# Patient Record
Sex: MALE | Race: BLACK OR AFRICAN AMERICAN | NOT HISPANIC OR LATINO | Employment: FULL TIME | URBAN - METROPOLITAN AREA
[De-identification: names, ages, dates, MRNs, and addresses within clinical notes are randomized per-mention and may not be internally consistent; named-entity substitution may affect disease eponyms.]

---

## 2024-03-12 ENCOUNTER — HOSPITAL ENCOUNTER (OUTPATIENT)
Dept: RADIOLOGY | Facility: CLINIC | Age: 44
Discharge: HOME | End: 2024-03-12
Payer: COMMERCIAL

## 2024-03-12 ENCOUNTER — OFFICE VISIT (OUTPATIENT)
Dept: ORTHOPEDIC SURGERY | Facility: CLINIC | Age: 44
End: 2024-03-12
Payer: COMMERCIAL

## 2024-03-12 VITALS — BODY MASS INDEX: 31.34 KG/M2 | HEIGHT: 66 IN | WEIGHT: 195 LBS

## 2024-03-12 DIAGNOSIS — M54.2 NECK PAIN: ICD-10-CM

## 2024-03-12 DIAGNOSIS — M54.2 NECK PAIN: Primary | ICD-10-CM

## 2024-03-12 PROCEDURE — 99213 OFFICE O/P EST LOW 20 MIN: CPT | Performed by: ORTHOPAEDIC SURGERY

## 2024-03-12 PROCEDURE — 72040 X-RAY EXAM NECK SPINE 2-3 VW: CPT | Performed by: ORTHOPAEDIC SURGERY

## 2024-03-12 PROCEDURE — 72040 X-RAY EXAM NECK SPINE 2-3 VW: CPT

## 2024-03-12 ASSESSMENT — PAIN SCALES - GENERAL: PAINLEVEL_OUTOF10: 0 - NO PAIN

## 2024-03-12 ASSESSMENT — PAIN - FUNCTIONAL ASSESSMENT: PAIN_FUNCTIONAL_ASSESSMENT: 0-10

## 2024-03-12 NOTE — PROGRESS NOTES
Payton Knight is a 43 y.o. male who presents for Follow-up of the Neck (C5-6 ACDF from 2/27/2023/1 year out/X-rays today).    HPI:  43-year-old gentleman here for surgical follow-up.  He is 1 year out from a C5-6 ACDF.  He denies any fever chills nausea vomiting night sweats.  He has no bowel or bladder complaints.    Physical exam:  Well-nourished, well kept.   Patient can rise from a seated position, can sit from a standing position. Can get up heels and toes.  Patient is not tender in the paraspinal musculature of the cervical spine. His range of motion is intact.  Examination of the upper extremities reveals no point tenderness, swelling, or deformity.  Range of motion of the shoulders, elbows, wrists, and fingers are full without crepitance, instability, or exacerbation of pain. Strength is 5/5 throughout. no redness, abrasions, or lesions on the upper extremities bilaterally.  Gross sensation intact to the extremities.  Deep tendon reflexes 1+ and symmetric bilaterally.  Jacobo negative.  Affect normal.  Alert and oriented X 3.  Coordination normal.  Incision is very well-healed.    Imaging studies:  AP lateral plain films of the cervical spine were obtained and reviewed today.    Assessment:  43-year-old gentleman here for surgical follow-up.  He is 1 year out from C5-6 ACDF.  He is doing great today.  100% better.  Occasional stiffness here and there in the neck, and he still has a little bit of numbness and tingling out into the fingers on his left hand but overall he is very happy with the outcome of the surgery.  He is engaging in activities as tolerated, he is back to doing the things he enjoys doing that he could not do before the surgery.    Plan:  For complete plan and/or surgical details, please refer to Dr. Loyd's portion of this split dictation.    In a face-to-face encounter, I performed a history and physical examination, discussed pertinent diagnostic studies if indicated, and discussed  diagnosis and management strategies with both the patient and the midlevel provider.  I reviewed the midlevel's note and agree with the documented findings and plan of care.      Patient doing very well 1 year out from a C5-6 ACDF.  He still has a little numbness in his hands which I think is carpal tunnel syndrome.  He has no radicular symptoms.  No neck pain.  X-rays look really good today.  Working to let him engage in activities as tolerated and he can follow-up with one of my hand partners for his carpal tunnel syndrome.  He can see me on a as needed basis.

## 2024-03-19 ENCOUNTER — APPOINTMENT (OUTPATIENT)
Dept: ORTHOPEDIC SURGERY | Facility: CLINIC | Age: 44
End: 2024-03-19
Payer: COMMERCIAL

## 2024-03-21 ENCOUNTER — APPOINTMENT (OUTPATIENT)
Dept: ORTHOPEDIC SURGERY | Facility: CLINIC | Age: 44
End: 2024-03-21
Payer: COMMERCIAL

## 2024-11-12 ENCOUNTER — APPOINTMENT (OUTPATIENT)
Dept: ORTHOPEDIC SURGERY | Facility: CLINIC | Age: 44
End: 2024-11-12
Payer: COMMERCIAL

## 2024-11-13 ENCOUNTER — APPOINTMENT (OUTPATIENT)
Dept: RADIOLOGY | Facility: HOSPITAL | Age: 44
End: 2024-11-13
Payer: COMMERCIAL

## 2024-11-13 ENCOUNTER — HOSPITAL ENCOUNTER (EMERGENCY)
Facility: HOSPITAL | Age: 44
Discharge: HOME | End: 2024-11-13
Attending: EMERGENCY MEDICINE
Payer: COMMERCIAL

## 2024-11-13 ENCOUNTER — APPOINTMENT (OUTPATIENT)
Dept: CARDIOLOGY | Facility: HOSPITAL | Age: 44
End: 2024-11-13
Payer: COMMERCIAL

## 2024-11-13 VITALS
HEART RATE: 87 BPM | TEMPERATURE: 98.1 F | SYSTOLIC BLOOD PRESSURE: 164 MMHG | HEIGHT: 66 IN | WEIGHT: 195 LBS | OXYGEN SATURATION: 100 % | DIASTOLIC BLOOD PRESSURE: 95 MMHG | RESPIRATION RATE: 18 BRPM | BODY MASS INDEX: 31.34 KG/M2

## 2024-11-13 DIAGNOSIS — G44.209 TENSION HEADACHE: ICD-10-CM

## 2024-11-13 DIAGNOSIS — R07.9 CHEST PAIN, UNSPECIFIED TYPE: Primary | ICD-10-CM

## 2024-11-13 LAB
ALBUMIN SERPL BCP-MCNC: 4.6 G/DL (ref 3.4–5)
ALP SERPL-CCNC: 74 U/L (ref 33–120)
ALT SERPL W P-5'-P-CCNC: 29 U/L (ref 10–52)
ANION GAP SERPL CALC-SCNC: 11 MMOL/L (ref 10–20)
APTT PPP: 30 SECONDS (ref 27–38)
AST SERPL W P-5'-P-CCNC: 25 U/L (ref 9–39)
ATRIAL RATE: 102 BPM
BASOPHILS # BLD AUTO: 0.02 X10*3/UL (ref 0–0.1)
BASOPHILS NFR BLD AUTO: 0.2 %
BILIRUB SERPL-MCNC: 0.5 MG/DL (ref 0–1.2)
BUN SERPL-MCNC: 11 MG/DL (ref 6–23)
CALCIUM SERPL-MCNC: 9.3 MG/DL (ref 8.6–10.3)
CARDIAC TROPONIN I PNL SERPL HS: 6 NG/L (ref 0–20)
CARDIAC TROPONIN I PNL SERPL HS: 6 NG/L (ref 0–20)
CHLORIDE SERPL-SCNC: 104 MMOL/L (ref 98–107)
CO2 SERPL-SCNC: 28 MMOL/L (ref 21–32)
CREAT SERPL-MCNC: 0.96 MG/DL (ref 0.5–1.3)
D DIMER PPP FEU-MCNC: <215 NG/ML FEU
EGFRCR SERPLBLD CKD-EPI 2021: >90 ML/MIN/1.73M*2
EOSINOPHIL # BLD AUTO: 0.07 X10*3/UL (ref 0–0.7)
EOSINOPHIL NFR BLD AUTO: 0.7 %
ERYTHROCYTE [DISTWIDTH] IN BLOOD BY AUTOMATED COUNT: 14.1 % (ref 11.5–14.5)
FLUAV RNA RESP QL NAA+PROBE: NOT DETECTED
FLUBV RNA RESP QL NAA+PROBE: NOT DETECTED
GLUCOSE SERPL-MCNC: 90 MG/DL (ref 74–99)
HCT VFR BLD AUTO: 42.2 % (ref 41–52)
HGB BLD-MCNC: 14.9 G/DL (ref 13.5–17.5)
IMM GRANULOCYTES # BLD AUTO: 0.02 X10*3/UL (ref 0–0.7)
IMM GRANULOCYTES NFR BLD AUTO: 0.2 % (ref 0–0.9)
INR PPP: 1 (ref 0.9–1.1)
LIPASE SERPL-CCNC: 43 U/L (ref 9–82)
LYMPHOCYTES # BLD AUTO: 3.92 X10*3/UL (ref 1.2–4.8)
LYMPHOCYTES NFR BLD AUTO: 40.7 %
MAGNESIUM SERPL-MCNC: 2.07 MG/DL (ref 1.6–2.4)
MCH RBC QN AUTO: 31.9 PG (ref 26–34)
MCHC RBC AUTO-ENTMCNC: 35.3 G/DL (ref 32–36)
MCV RBC AUTO: 90 FL (ref 80–100)
MONOCYTES # BLD AUTO: 0.91 X10*3/UL (ref 0.1–1)
MONOCYTES NFR BLD AUTO: 9.4 %
NEUTROPHILS # BLD AUTO: 4.7 X10*3/UL (ref 1.2–7.7)
NEUTROPHILS NFR BLD AUTO: 48.8 %
NRBC BLD-RTO: 0 /100 WBCS (ref 0–0)
P AXIS: 52 DEGREES
P OFFSET: 210 MS
P ONSET: 162 MS
PLATELET # BLD AUTO: 282 X10*3/UL (ref 150–450)
POTASSIUM SERPL-SCNC: 3.6 MMOL/L (ref 3.5–5.3)
PR INTERVAL: 126 MS
PROT SERPL-MCNC: 7.4 G/DL (ref 6.4–8.2)
PROTHROMBIN TIME: 11.5 SECONDS (ref 9.8–12.8)
Q ONSET: 225 MS
QRS COUNT: 17 BEATS
QRS DURATION: 84 MS
QT INTERVAL: 338 MS
QTC CALCULATION(BAZETT): 440 MS
QTC FREDERICIA: 403 MS
R AXIS: 31 DEGREES
RBC # BLD AUTO: 4.67 X10*6/UL (ref 4.5–5.9)
SARS-COV-2 RNA RESP QL NAA+PROBE: NOT DETECTED
SODIUM SERPL-SCNC: 139 MMOL/L (ref 136–145)
T AXIS: 8 DEGREES
T OFFSET: 394 MS
TSH SERPL-ACNC: 1.88 MIU/L (ref 0.44–3.98)
VENTRICULAR RATE: 102 BPM
WBC # BLD AUTO: 9.6 X10*3/UL (ref 4.4–11.3)

## 2024-11-13 PROCEDURE — 70450 CT HEAD/BRAIN W/O DYE: CPT | Performed by: RADIOLOGY

## 2024-11-13 PROCEDURE — 84484 ASSAY OF TROPONIN QUANT: CPT

## 2024-11-13 PROCEDURE — 85610 PROTHROMBIN TIME: CPT

## 2024-11-13 PROCEDURE — 84443 ASSAY THYROID STIM HORMONE: CPT

## 2024-11-13 PROCEDURE — 87636 SARSCOV2 & INF A&B AMP PRB: CPT

## 2024-11-13 PROCEDURE — 71045 X-RAY EXAM CHEST 1 VIEW: CPT

## 2024-11-13 PROCEDURE — 96374 THER/PROPH/DIAG INJ IV PUSH: CPT

## 2024-11-13 PROCEDURE — 83690 ASSAY OF LIPASE: CPT

## 2024-11-13 PROCEDURE — 36415 COLL VENOUS BLD VENIPUNCTURE: CPT

## 2024-11-13 PROCEDURE — 83735 ASSAY OF MAGNESIUM: CPT

## 2024-11-13 PROCEDURE — 71045 X-RAY EXAM CHEST 1 VIEW: CPT | Performed by: RADIOLOGY

## 2024-11-13 PROCEDURE — 70450 CT HEAD/BRAIN W/O DYE: CPT

## 2024-11-13 PROCEDURE — 84075 ASSAY ALKALINE PHOSPHATASE: CPT

## 2024-11-13 PROCEDURE — 85379 FIBRIN DEGRADATION QUANT: CPT

## 2024-11-13 PROCEDURE — 2500000004 HC RX 250 GENERAL PHARMACY W/ HCPCS (ALT 636 FOR OP/ED)

## 2024-11-13 PROCEDURE — 99285 EMERGENCY DEPT VISIT HI MDM: CPT | Mod: 25

## 2024-11-13 PROCEDURE — 85025 COMPLETE CBC W/AUTO DIFF WBC: CPT

## 2024-11-13 PROCEDURE — 93005 ELECTROCARDIOGRAM TRACING: CPT

## 2024-11-13 RX ORDER — NAPROXEN SODIUM 550 MG/1
550 TABLET ORAL
Qty: 14 TABLET | Refills: 0 | Status: SHIPPED | OUTPATIENT
Start: 2024-11-13 | End: 2024-11-20

## 2024-11-13 RX ORDER — CETIRIZINE HYDROCHLORIDE 10 MG/1
10 TABLET ORAL
COMMUNITY
Start: 2023-01-17

## 2024-11-13 RX ORDER — ATORVASTATIN CALCIUM 10 MG/1
10 TABLET, FILM COATED ORAL DAILY
COMMUNITY

## 2024-11-13 RX ORDER — KETOROLAC TROMETHAMINE 30 MG/ML
30 INJECTION, SOLUTION INTRAMUSCULAR; INTRAVENOUS ONCE
Status: COMPLETED | OUTPATIENT
Start: 2024-11-13 | End: 2024-11-13

## 2024-11-13 RX ORDER — ALBUTEROL SULFATE 90 UG/1
2 INHALANT RESPIRATORY (INHALATION) EVERY 4 HOURS PRN
COMMUNITY
Start: 2020-01-12 | End: 2025-07-18

## 2024-11-13 ASSESSMENT — LIFESTYLE VARIABLES
EVER HAD A DRINK FIRST THING IN THE MORNING TO STEADY YOUR NERVES TO GET RID OF A HANGOVER: NO
EVER FELT BAD OR GUILTY ABOUT YOUR DRINKING: NO
TOTAL SCORE: 0
HAVE PEOPLE ANNOYED YOU BY CRITICIZING YOUR DRINKING: NO
HAVE YOU EVER FELT YOU SHOULD CUT DOWN ON YOUR DRINKING: NO

## 2024-11-13 ASSESSMENT — COLUMBIA-SUICIDE SEVERITY RATING SCALE - C-SSRS
2. HAVE YOU ACTUALLY HAD ANY THOUGHTS OF KILLING YOURSELF?: NO
1. IN THE PAST MONTH, HAVE YOU WISHED YOU WERE DEAD OR WISHED YOU COULD GO TO SLEEP AND NOT WAKE UP?: NO
6. HAVE YOU EVER DONE ANYTHING, STARTED TO DO ANYTHING, OR PREPARED TO DO ANYTHING TO END YOUR LIFE?: NO

## 2024-11-13 ASSESSMENT — PAIN SCALES - PAIN ASSESSMENT IN ADVANCED DEMENTIA (PAINAD): TOTALSCORE: MEDICATION (SEE MAR)

## 2024-11-13 ASSESSMENT — PAIN DESCRIPTION - DESCRIPTORS: DESCRIPTORS: PRESSURE

## 2024-11-13 ASSESSMENT — PAIN DESCRIPTION - ONSET: ONSET: ONGOING

## 2024-11-13 ASSESSMENT — PAIN DESCRIPTION - PAIN TYPE: TYPE: ACUTE PAIN

## 2024-11-13 ASSESSMENT — PAIN DESCRIPTION - LOCATION: LOCATION: CHEST

## 2024-11-13 ASSESSMENT — PAIN SCALES - GENERAL: PAINLEVEL_OUTOF10: 7

## 2024-11-13 ASSESSMENT — PAIN DESCRIPTION - FREQUENCY: FREQUENCY: CONSTANT/CONTINUOUS

## 2024-11-13 ASSESSMENT — PAIN DESCRIPTION - PROGRESSION: CLINICAL_PROGRESSION: NOT CHANGED

## 2024-11-13 ASSESSMENT — PAIN - FUNCTIONAL ASSESSMENT: PAIN_FUNCTIONAL_ASSESSMENT: 0-10

## 2024-11-13 NOTE — ED PROVIDER NOTES
"HPI   Chief Complaint   Patient presents with    Chest Pain     \"Pain in chest, head, and hands/feet are ice cold. I feel like my head is on fire\"    Flu Symptoms       History provided by: Patient    Limitations to history: None    CC: Multiple medical complaints    HPI: 43-year-old male with a history of asthma presents the emergency department to be evaluated for multiple medical complaints.  Patient states that he feels he started feeling generally unwell earlier today.  He reports feeling warm without documented fever and chills.  Denies body aches.  Reports a mild generalized headache that started 1 hour prior to arrival.  He characterized the headache is \"aching \"and localizes to the top of his head.  He denies it having sudden or abrupt onset or being the worst headache he is ever had.  Denies head injury or syncopal episode.  Denies use of anticoagulants or antiplatelets or history of intracranial hemorrhage.  Denies neck pain or stiffness.  Denies vision changes.  Denies sensitivity to light and sound.  Denies slurred speech or facial drooping.  Denies numbness tingling or weakness in extremities.  Denies lightheadedness or dizziness.  Patient also states he is having some chest achiness as well.  Denies shortness of breath.  Does report intermittent palpitations.  Patient denies history of CAD, his last stress test was approximately February 2024 but is never required stent placement or bypass.  He denies history of heart failure and denies pain or swelling in the extremities with use of diuretics.  He denies history of DVTs or PEs and he denies recent plane flights, recent surgeries, history of cancer.  He does have a history of asthma and he was in his inhaler twice today but did not help much with his symptoms.  He denies cough or hemoptysis.  Denies runny nose and congestion.  Denies nausea vomiting, diarrhea constipation, blood in the urine or stool.  Denies abdominal pain or flank pain.  Denies " weakness and fatigue.  Denies all other systemic symptoms.    ROS: Negative unless mentioned in HPI    Medical Hx: Allergy to [place] and hydrocodone.  Immunizations up-to-date.    Physical exam:    Constitutional: Patient is well-nourished and well-developed.  Sitting comfortably in the room and in no distress.  Oriented to person, place, time, and situation.    HEENT: Head is normocephalic, atraumatic. Patient's airway is patent.  Tympanic membranes are clear bilaterally.  Nasal mucosa clear.  Mouth with normal mucosa.  Throat is not erythematous and there are no oropharyngeal exudates, uvula is midline.  No obvious facial deformities.  No nuchal rigidity or meningeal signs.    Eyes: Clear bilaterally.  Pupils are equal round and reactive to light and accommodation.  Extraocular movements intact.      Cardiac: Tachycardia, regular rhythm.  Heart sounds S1, S2.  No murmurs, rubs, or gallops.  PMI nondisplaced.  No JVD.    Respiratory: Regular respiratory rate and effort.  Breath sounds are clear and equal bilaterally, no adventitious lung sounds.  Patient is speaking in full sentences and is in no apparent respiratory distress. No use of accessory muscles.      Gastrointestinal: Abdomen is soft, nondistended, and nontender.  There are no obvious deformities.  No rebound tenderness or guarding.  Bowel sounds are normal active.    Genitourinary: No CVA or flank tenderness.    Musculoskeletal: No reproducible tenderness.  No obvious skin or bony deformities.  Patient has equal range of motion in all extremities and no strength deficiencies.  No muscle or joint tenderness. No back or neck tenderness.  Capillary refill less than 3 seconds.  Strong peripheral pulses.  No sensory deficits.    Neurological: Patient is alert and oriented.  No focal deficits.  5/5 strength in all extremities.  Cranial nerves II through XII intact. GCS15.  NIH 0.    Skin: Skin is normal color for race and is warm, dry, and intact.  No evidence  of trauma.  No lesions, rashes, bruising, jaundice, or masses.    Psych: Appropriate mood and affect.  No apparent risk to self or others.    Heme/lymph: No adenopathy, lymphadenopathy, or splenomegaly    Physical exam is otherwise negative unless stated above or in history of present illness.              Patient History   No past medical history on file.  Past Surgical History:   Procedure Laterality Date    XR CHEST PACEMAKER WITH FLUORO  2/27/2023    XR CHEST PACEMAKER WITH FLUORO 2/27/2023 [facility]     No family history on file.  Social History     Tobacco Use    Smoking status: Every Day     Types: Cigarettes    Smokeless tobacco: Never   Substance Use Topics    Alcohol use: Yes     Comment: socially    Drug use: Yes     Types: Marijuana       Physical Exam   ED Triage Vitals [11/13/24 1404]   Temperature Heart Rate Respirations BP   36.7 °C (98.1 °F) (!) 115 20 155/79      Pulse Ox Temp Source Heart Rate Source Patient Position   100 % Tympanic Monitor Sitting      BP Location FiO2 (%)     Left arm --       Physical Exam      ED Course & MDM   Diagnoses as of 11/13/24 1643   Chest pain, unspecified type   Tension headache        Patient updated on plan for lab testing, IV insertion, radiology imaging, and medications to be administered while in the ER (if indicated). Patient updated on expected wait times for testing and results. Patient provided my name and told to ask any staff member for questions or concerns if they should arise. Electronic medical record reviewed.     MDM    Upon initial assessment, patient was healthy non-toxic appearing and in no apparent distress.     Patient presented to the emergency department with the chief complaint chest pain, headache.  Headache, chills, chest pain.  Examination of the patient's heart and lungs is unremarkable other than mild tachycardia.  His breath sounds are clear and equal bilaterally and he is 100% on room air.  In no acute aspiratory distress.   He has no neurological deficits or nuchal rigidity or meningeal signs.  On arrival to the emergency department, vital signs were significant for tachycardia.    EKG performed at 1358 and interpreted by me.  Sinus tachycardia 102 beats minute.  Normal axis.  No ST elevation or depression.  No prolonged QT.  Patient has nonspecific T wave version in lead V1 and III.    Will give the patient IV Toradol for his headache.  Will obtain basic blood work, EKG and troponin, chest x-ray, D-dimer, COVID and influenza PCR.  Based on the patient's history and physical exam I have a low suspicion for intracranial hemorrhage, stroke, subarachnoid hemorrhage, meningitis.  Low suspicion for dissection given the patient's history and physical exam.    Patient states that his headache has completely resolved however he did provide a different history from my attending.  States that the headache was not the worst it is ever had but he does not typically have headaches.  He repeat neurological exam overall is unremarkable.  Given this information, we will obtain a CT of the head for further evaluation.    Patient is feeling much better after medic patients and his vital signs are stable.  He is no longer tachycardic.  Patient's original repeat troponin within normal is making ACS unlikely.  Heart score is 1 given his smoking history.  Patient's viral swabs are negative.  Thyroid functions within normal limits per lipase is 43.  D-dimer is negative making PE unlikely.  Magnesium is 2.07.  CMP is unremarkable for any acute abnormalities.  CBC reveals no leukocytosis or anemia.  Chest x-ray reveals no pneumonia or any other acute cardiopulmonary process and CT of the head is unremarkable.  Patient feels well and feels comfortable going home.  I do believe he is likely experiencing a viral syndrome.  He will be discharged with Anaprox.  Follow-up with primary care provider and I will have registration set him up with an appointment to  follow-up with cardiology.  I do believe he benefit from an outpatient stress test.  All questions and concerns addressed.  Reasons to return to ER discussed.  Patient verbalized understanding and agreement with the treatment plan and they remained hemodynamically stable in the ER.    This note was dictated using a speech recognition program.  While an attempt was made at proof-reading to minimize errors, minor errors in transcription may be present         No data recorded                                 Medical Decision Making      Procedure  Procedures     Elliot Mckeon PA-C  11/13/24 5046

## 2024-11-19 ENCOUNTER — APPOINTMENT (OUTPATIENT)
Dept: ORTHOPEDIC SURGERY | Facility: CLINIC | Age: 44
End: 2024-11-19
Payer: COMMERCIAL

## 2024-11-26 ENCOUNTER — OFFICE VISIT (OUTPATIENT)
Dept: ORTHOPEDIC SURGERY | Facility: CLINIC | Age: 44
End: 2024-11-26
Payer: COMMERCIAL

## 2024-11-26 ENCOUNTER — HOSPITAL ENCOUNTER (OUTPATIENT)
Dept: RADIOLOGY | Facility: CLINIC | Age: 44
Discharge: HOME | End: 2024-11-26
Payer: COMMERCIAL

## 2024-11-26 VITALS — WEIGHT: 195 LBS | BODY MASS INDEX: 31.34 KG/M2 | HEIGHT: 66 IN

## 2024-11-26 DIAGNOSIS — M54.2 CERVICAL PAIN: ICD-10-CM

## 2024-11-26 DIAGNOSIS — M54.2 CERVICAL PAIN: Primary | ICD-10-CM

## 2024-11-26 PROCEDURE — 72040 X-RAY EXAM NECK SPINE 2-3 VW: CPT

## 2024-11-26 PROCEDURE — 99214 OFFICE O/P EST MOD 30 MIN: CPT | Performed by: ORTHOPAEDIC SURGERY

## 2024-11-26 PROCEDURE — 72040 X-RAY EXAM NECK SPINE 2-3 VW: CPT | Performed by: ORTHOPAEDIC SURGERY

## 2024-11-26 PROCEDURE — 3008F BODY MASS INDEX DOCD: CPT | Performed by: ORTHOPAEDIC SURGERY

## 2024-11-26 NOTE — PROGRESS NOTES
Payton Knight is a 43 y.o. male who presents for Follow-up of the Neck (2/27/23 C5-6 ACDF, xrays today).    HPI:  43-year-old gentleman here for follow-up.  He is 1 year and 9 months out from a C5-6 ACDF.  He denies any fever chills nausea vomiting night sweats.  He has no bowel or bladder complaints.    Physical exam:  Well-nourished, well kept.  No lymphangitis or lymphadenopathy in the examined extremities. Affect normal.  Alert and oriented X 3.  Coordination normal.  Patient can rise from a seated position, can sit from a standing position. Can stand on heels and toes.  Patient is tender in the paraspinal musculature of the cervical spine. range of motion is mildly decreased secondary to some pain and stiffness no weakness no instability to muscle strength. examination of the upper extremities reveals no point tenderness, swelling, or deformity.  Range of motion of the shoulders, elbows, wrists, and fingers are full without crepitance, instability, or exacerbation of pain. Strength is 5/5 throughout. no redness, abrasions, or lesions on the upper extremities bilaterally.  Gross sensation intact to the extremities.  Deep tendon reflexes 1+ and symmetric bilaterally.  Jacobo negative.  Positive Tinel's on the left.  Negative Phalen's bilaterally.  Anterior cervical incision is very well-healed.    Imaging studies:  AP lateral plain films of the cervical spine were ordered and reviewed today.    Assessment:  43-year-old gentleman here for follow-up.  He is 1 year and 9 months out from a C5-6 ACDF.  He was doing good till about a month or 2 ago, he has started to have numbness and tingling in his arms bilaterally he still has some numbness and tingling in his left hand and index and middle finger on the left, and he is having some neck stiffness.  He was running from a dog a month or so ago and jumped into his truck and struck his head on the top of the vehicle.  He started having these problems after that.  He  did go to the emergency department a couple of weeks ago for headaches, these were worked up and he was diagnosed with a viral illness such as the flu.  He did do some physical therapy for this, he thinks it may be helped a little bit.  He does have a positive Tinel's on the left, he most likely has some carpal tunnel on that side, this was present prior to his surgery.    We have ordered and reviewed tests, x-rays.  I reviewed the emergency department notes from November 13, 2024.  This is an exacerbation of a chronic problem that is affecting his bodily function.    For complete plan and/or surgical details, please refer to Dr. Loyd's portion of this split dictation.    -Nacho Tenorio PA-C    In a face-to-face encounter, I performed a history and physical examination, discussed pertinent diagnostic studies if indicated, and discussed diagnosis and management strategies with both the patient and the midlevel provider.  I reviewed the midlevel's note and agree with the documented findings and plan of care.    Patient was running from a dog jumped into his car and hit his head.  I did a C5-6 ACDF on him almost 2 years ago.  He now has new increased neck pain and some numbness in his left forearm and hand.  The numbness is chronic but increased slightly.  The neck pain is already somewhat improving.  X-rays were ordered and reviewed today and they look good.  There is no evidence of any acute trauma and the fusion level looks nice.  We are going to get him into some physical therapy and we are going to get him into one of my hand partners for left carpal tunnel syndrome and possible elbow issues.  If he does not continue to improve over the next month or so he is can follow back up with us and we will get an MRI of the cervical spine.  All in all he says he is already heading in the right direction and he just wanted to make sure everything was okay.  We have reviewed notes from an ER visit that showed that  he had some burning in his hands and feet but that was related to a URI.  This is a new acute problem of uncertain prognosis.    Anish Loyd MD  Orthopedic surgery

## 2024-12-02 ENCOUNTER — APPOINTMENT (OUTPATIENT)
Dept: ORTHOPEDIC SURGERY | Facility: CLINIC | Age: 44
End: 2024-12-02
Payer: COMMERCIAL

## 2024-12-12 ENCOUNTER — APPOINTMENT (OUTPATIENT)
Dept: CARDIOLOGY | Facility: CLINIC | Age: 44
End: 2024-12-12
Payer: COMMERCIAL

## 2024-12-19 ENCOUNTER — APPOINTMENT (OUTPATIENT)
Dept: ORTHOPEDIC SURGERY | Facility: CLINIC | Age: 44
End: 2024-12-19
Payer: COMMERCIAL

## 2025-02-10 ENCOUNTER — APPOINTMENT (OUTPATIENT)
Dept: ORTHOPEDIC SURGERY | Facility: CLINIC | Age: 45
End: 2025-02-10
Payer: COMMERCIAL

## 2025-08-27 ENCOUNTER — OFFICE VISIT (OUTPATIENT)
Dept: ORTHOPEDIC SURGERY | Facility: CLINIC | Age: 45
End: 2025-08-27
Payer: COMMERCIAL

## 2025-08-27 ENCOUNTER — HOSPITAL ENCOUNTER (OUTPATIENT)
Dept: RADIOLOGY | Facility: CLINIC | Age: 45
Discharge: HOME | End: 2025-08-27
Payer: COMMERCIAL

## 2025-08-27 DIAGNOSIS — M54.12 CERVICAL RADICULOPATHY: Primary | ICD-10-CM

## 2025-08-27 DIAGNOSIS — M54.2 NECK PAIN: ICD-10-CM

## 2025-08-27 PROCEDURE — 72050 X-RAY EXAM NECK SPINE 4/5VWS: CPT

## 2025-08-27 PROCEDURE — 72050 X-RAY EXAM NECK SPINE 4/5VWS: CPT | Performed by: ORTHOPAEDIC SURGERY

## 2025-08-27 PROCEDURE — 99214 OFFICE O/P EST MOD 30 MIN: CPT | Performed by: PHYSICIAN ASSISTANT

## 2025-08-27 PROCEDURE — 99212 OFFICE O/P EST SF 10 MIN: CPT | Performed by: PHYSICIAN ASSISTANT

## 2025-09-15 ENCOUNTER — APPOINTMENT (OUTPATIENT)
Dept: RADIOLOGY | Facility: HOSPITAL | Age: 45
End: 2025-09-15
Payer: COMMERCIAL